# Patient Record
Sex: FEMALE | Race: WHITE | Employment: FULL TIME | URBAN - METROPOLITAN AREA
[De-identification: names, ages, dates, MRNs, and addresses within clinical notes are randomized per-mention and may not be internally consistent; named-entity substitution may affect disease eponyms.]

---

## 2018-04-27 PROBLEM — I10 ESSENTIAL HYPERTENSION: Status: ACTIVE | Noted: 2018-04-27

## 2018-04-27 PROBLEM — Z00.00 LABORATORY EXAM ORDERED AS PART OF ROUTINE GENERAL MEDICAL EXAMINATION: Status: ACTIVE | Noted: 2018-04-27

## 2018-04-27 PROBLEM — M25.561 KNEE PAIN, BILATERAL: Status: ACTIVE | Noted: 2018-04-27

## 2018-04-27 PROBLEM — J30.2 SEASONAL ALLERGIC RHINITIS: Status: ACTIVE | Noted: 2018-04-27

## 2018-04-27 PROBLEM — E66.9 OBESITY: Status: ACTIVE | Noted: 2018-04-27

## 2018-04-27 PROBLEM — R73.9 HYPERGLYCEMIA: Status: ACTIVE | Noted: 2018-04-27

## 2018-04-27 PROBLEM — E03.9 HYPOTHYROIDISM: Status: ACTIVE | Noted: 2018-04-27

## 2018-04-27 PROBLEM — R05.9 COUGH: Status: ACTIVE | Noted: 2018-04-27

## 2018-04-27 PROBLEM — E78.5 HYPERLIPIDEMIA: Status: ACTIVE | Noted: 2018-04-27

## 2018-04-27 PROBLEM — G47.00 INSOMNIA: Status: ACTIVE | Noted: 2018-04-27

## 2018-04-27 PROBLEM — M25.562 KNEE PAIN, BILATERAL: Status: ACTIVE | Noted: 2018-04-27

## 2018-04-27 PROBLEM — J45.909 ASTHMA: Status: ACTIVE | Noted: 2018-04-27

## 2018-04-27 PROBLEM — M79.7 FIBROMYALGIA: Status: ACTIVE | Noted: 2018-04-27

## 2018-06-21 PROBLEM — E66.01 SEVERE OBESITY (BMI 35.0-39.9): Status: ACTIVE | Noted: 2018-06-21

## 2018-12-18 PROBLEM — J34.89 SINUS PRESSURE: Status: ACTIVE | Noted: 2018-12-18

## 2018-12-18 PROBLEM — M54.2 NECK PAIN: Status: ACTIVE | Noted: 2018-12-18

## 2018-12-18 PROBLEM — R20.0 RIGHT FACIAL NUMBNESS: Status: ACTIVE | Noted: 2018-12-18

## 2019-03-21 PROBLEM — R05.9 COUGH: Status: RESOLVED | Noted: 2018-04-27 | Resolved: 2019-03-21

## 2019-03-21 PROBLEM — J34.89 SINUS PRESSURE: Status: RESOLVED | Noted: 2018-12-18 | Resolved: 2019-03-21

## 2019-06-14 PROBLEM — M70.62 TROCHANTERIC BURSITIS OF LEFT HIP: Status: ACTIVE | Noted: 2019-06-14

## 2019-06-14 PROBLEM — E66.9 OBESITY (BMI 35.0-39.9 WITHOUT COMORBIDITY): Status: ACTIVE | Noted: 2019-06-14

## 2019-06-14 PROBLEM — M15.9 PRIMARY OSTEOARTHRITIS INVOLVING MULTIPLE JOINTS: Status: ACTIVE | Noted: 2019-06-14

## 2020-03-06 PROBLEM — G25.81 RESTLESS LEGS: Status: ACTIVE | Noted: 2020-03-06

## 2020-09-09 PROBLEM — I21.4 NSTEMI (NON-ST ELEVATED MYOCARDIAL INFARCTION) (HCC): Status: ACTIVE | Noted: 2020-09-09

## 2020-10-21 ENCOUNTER — PATIENT OUTREACH (OUTPATIENT)
Dept: OTHER | Age: 55
End: 2020-10-21

## 2020-10-21 NOTE — PROGRESS NOTES
10/21/2020, Per chart review, S/P 9/9/2020 - 9/11/2020 at [facility]) related to NSTEMI with stent placement. Patient on report as eligible for Case Management. Reached associate at listed phone number, HIPAA verified. Ms Tessa Cullen allowed this writer/ACM explain purpose of outreach/ACM role. Ms Taveras Mi understanding, states, doing ok, was not able to attend [facility], states benefits not available, only at Tier3/only 70% and would have been >$500 per week. Instruction provided on importance of continued health maintenance, case management services to assist with personal goal work, and upcoming open benefit enrollment. Ms Tessa Cullen states keeping F/U appts and denies need for CM services at this, agrees to keep ACM contact information to call with further questions or concerns. PLAN: Will not make further outreach attempts at this time, remain available for any further CM needs.

## 2020-11-06 PROBLEM — F41.9 ANXIETY: Status: ACTIVE | Noted: 2020-11-06

## 2021-02-18 PROBLEM — E66.01 SEVERE OBESITY WITH BODY MASS INDEX (BMI) OF 35.0 TO 39.9 WITH SERIOUS COMORBIDITY (HCC): Status: RESOLVED | Noted: 2018-06-21 | Resolved: 2021-02-18

## 2021-04-08 PROBLEM — I25.10 CORONARY ARTERY DISEASE INVOLVING NATIVE CORONARY ARTERY OF NATIVE HEART WITHOUT ANGINA PECTORIS: Status: ACTIVE | Noted: 2021-04-08

## 2021-04-08 PROBLEM — I21.4 NSTEMI (NON-ST ELEVATED MYOCARDIAL INFARCTION) (HCC): Status: RESOLVED | Noted: 2020-09-09 | Resolved: 2021-04-08

## 2021-06-28 PROBLEM — R06.83 SNORING: Status: ACTIVE | Noted: 2021-06-28

## 2021-07-27 PROBLEM — G47.33 OSA (OBSTRUCTIVE SLEEP APNEA): Status: ACTIVE | Noted: 2021-07-27
